# Patient Record
Sex: FEMALE | Race: WHITE
[De-identification: names, ages, dates, MRNs, and addresses within clinical notes are randomized per-mention and may not be internally consistent; named-entity substitution may affect disease eponyms.]

---

## 2017-07-21 ENCOUNTER — HOSPITAL ENCOUNTER (OUTPATIENT)
Dept: HOSPITAL 47 - LABPAT | Age: 72
Discharge: HOME | End: 2017-07-21
Payer: MEDICARE

## 2017-07-21 DIAGNOSIS — Z79.01: ICD-10-CM

## 2017-07-21 DIAGNOSIS — Z01.812: ICD-10-CM

## 2017-07-21 DIAGNOSIS — Z01.810: Primary | ICD-10-CM

## 2017-07-21 LAB
ALP SERPL-CCNC: 106 U/L (ref 38–126)
ALT SERPL-CCNC: 29 U/L (ref 9–52)
ANION GAP SERPL CALC-SCNC: 9 MMOL/L
APTT BLD: 25 SEC (ref 22–30)
AST SERPL-CCNC: 19 U/L (ref 14–36)
BUN SERPL-SCNC: 14 MG/DL (ref 7–17)
CALCIUM SPEC-MCNC: 9.4 MG/DL (ref 8.4–10.2)
CH: 32.9
CHCM: 35.4
CHLORIDE SERPL-SCNC: 103 MMOL/L (ref 98–107)
CO2 SERPL-SCNC: 27 MMOL/L (ref 22–30)
EKG: (no result)
ERYTHROCYTE [DISTWIDTH] IN BLOOD BY AUTOMATED COUNT: 4.57 M/UL (ref 3.8–5.4)
ERYTHROCYTE [DISTWIDTH] IN BLOOD: 16 % (ref 11.5–15.5)
GLUCOSE SERPL-MCNC: 69 MG/DL (ref 74–99)
HCT VFR BLD AUTO: 42.7 % (ref 34–46)
HDW: 3.14
HGB BLD-MCNC: 15 GM/DL (ref 11.4–16)
INR PPP: 1.1 (ref ?–1.2)
MCH RBC QN AUTO: 32.9 PG (ref 25–35)
MCHC RBC AUTO-ENTMCNC: 35.2 G/DL (ref 31–37)
MCV RBC AUTO: 93.5 FL (ref 80–100)
NON-AFRICAN AMERICAN GFR(MDRD): >60
PH UR: 6 [PH] (ref 5–8)
POTASSIUM SERPL-SCNC: 4.4 MMOL/L (ref 3.5–5.1)
PROT SERPL-MCNC: 6.4 G/DL (ref 6.3–8.2)
PT BLD: 10.8 SEC (ref 9–12)
SODIUM SERPL-SCNC: 139 MMOL/L (ref 137–145)
SP GR UR: 1.02 (ref 1–1.03)
UA BILLING (MACRO VS. MICRO): (no result)
UROBILINOGEN UR QL STRIP: <2 MG/DL (ref ?–2)
WBC # BLD AUTO: 7.2 K/UL (ref 3.8–10.6)

## 2017-07-21 PROCEDURE — 85027 COMPLETE CBC AUTOMATED: CPT

## 2017-07-21 PROCEDURE — 85610 PROTHROMBIN TIME: CPT

## 2017-07-21 PROCEDURE — 85730 THROMBOPLASTIN TIME PARTIAL: CPT

## 2017-07-21 PROCEDURE — 80053 COMPREHEN METABOLIC PANEL: CPT

## 2017-07-21 PROCEDURE — 87070 CULTURE OTHR SPECIMN AEROBIC: CPT

## 2017-07-21 PROCEDURE — 93005 ELECTROCARDIOGRAM TRACING: CPT

## 2017-07-21 PROCEDURE — 81003 URINALYSIS AUTO W/O SCOPE: CPT

## 2017-07-21 PROCEDURE — 36415 COLL VENOUS BLD VENIPUNCTURE: CPT

## 2017-07-31 ENCOUNTER — HOSPITAL ENCOUNTER (INPATIENT)
Dept: HOSPITAL 47 - 2ORMAIN | Age: 72
LOS: 1 days | Discharge: HOME HEALTH | DRG: 470 | End: 2017-08-01
Payer: COMMERCIAL

## 2017-07-31 VITALS — RESPIRATION RATE: 16 BRPM

## 2017-07-31 VITALS — BODY MASS INDEX: 26.4 KG/M2

## 2017-07-31 DIAGNOSIS — I25.2: ICD-10-CM

## 2017-07-31 DIAGNOSIS — K21.9: ICD-10-CM

## 2017-07-31 DIAGNOSIS — I25.10: ICD-10-CM

## 2017-07-31 DIAGNOSIS — Z88.2: ICD-10-CM

## 2017-07-31 DIAGNOSIS — I10: ICD-10-CM

## 2017-07-31 DIAGNOSIS — Z79.899: ICD-10-CM

## 2017-07-31 DIAGNOSIS — D62: ICD-10-CM

## 2017-07-31 DIAGNOSIS — M06.9: ICD-10-CM

## 2017-07-31 DIAGNOSIS — K57.30: ICD-10-CM

## 2017-07-31 DIAGNOSIS — M16.11: Primary | ICD-10-CM

## 2017-07-31 DIAGNOSIS — Z87.891: ICD-10-CM

## 2017-07-31 DIAGNOSIS — Z79.82: ICD-10-CM

## 2017-07-31 DIAGNOSIS — Z95.5: ICD-10-CM

## 2017-07-31 DIAGNOSIS — Z85.72: ICD-10-CM

## 2017-07-31 DIAGNOSIS — Z95.1: ICD-10-CM

## 2017-07-31 DIAGNOSIS — Z88.5: ICD-10-CM

## 2017-07-31 PROCEDURE — 0SR904A REPLACEMENT OF RIGHT HIP JOINT WITH CERAMIC ON POLYETHYLENE SYNTHETIC SUBSTITUTE, UNCEMENTED, OPEN APPROACH: ICD-10-PCS

## 2017-07-31 PROCEDURE — 88300 SURGICAL PATH GROSS: CPT

## 2017-07-31 PROCEDURE — 85025 COMPLETE CBC W/AUTO DIFF WBC: CPT

## 2017-07-31 PROCEDURE — 86901 BLOOD TYPING SEROLOGIC RH(D): CPT

## 2017-07-31 PROCEDURE — 86850 RBC ANTIBODY SCREEN: CPT

## 2017-07-31 PROCEDURE — 73501 X-RAY EXAM HIP UNI 1 VIEW: CPT

## 2017-07-31 PROCEDURE — 86900 BLOOD TYPING SEROLOGIC ABO: CPT

## 2017-07-31 RX ADMIN — CARVEDILOL SCH MG: 12.5 TABLET, FILM COATED ORAL at 20:45

## 2017-07-31 RX ADMIN — HYDROMORPHONE HYDROCHLORIDE PRN MG: 1 INJECTION, SOLUTION INTRAMUSCULAR; INTRAVENOUS; SUBCUTANEOUS at 10:02

## 2017-07-31 RX ADMIN — ASPIRIN 325 MG ORAL TABLET SCH MG: 325 PILL ORAL at 20:44

## 2017-07-31 RX ADMIN — HYDROMORPHONE HYDROCHLORIDE PRN MG: 1 INJECTION, SOLUTION INTRAMUSCULAR; INTRAVENOUS; SUBCUTANEOUS at 09:54

## 2017-07-31 RX ADMIN — CEFAZOLIN SCH: 330 INJECTION, POWDER, FOR SOLUTION INTRAMUSCULAR; INTRAVENOUS at 13:55

## 2017-07-31 RX ADMIN — HYDROMORPHONE HYDROCHLORIDE PRN MG: 1 INJECTION, SOLUTION INTRAMUSCULAR; INTRAVENOUS; SUBCUTANEOUS at 10:11

## 2017-07-31 RX ADMIN — POTASSIUM CHLORIDE SCH MLS: 14.9 INJECTION, SOLUTION INTRAVENOUS at 06:52

## 2017-07-31 NOTE — XR
Right hip limited

 

HISTORY: Hip replacement

 

2 intraoperative C-arm images document the procedure.

## 2017-07-31 NOTE — XR
EXAMINATION TYPE: XR Hip Limited RT

 

DATE OF EXAM: 7/31/2017

 

CLINICAL HISTORY: Right hip pain and osteoarthritis status post total replacement.

 

TECHNIQUE: Single AP portable view of right hip is obtained immediately postoperatively.  

 

COMPARISON: None.

 

FINDINGS: Metallic hardware from total right hip arthroplasty is seen and appears satisfactory in ali
gnment and position.  There is evidence of recent surgery with subcutaneous gas noted laterally.  

 

IMPRESSION: Metallic hardware from total right hip arthroplasty is satisfactory in position.

## 2017-07-31 NOTE — P.CONS
History of Present Illness





- Reason for Consult


Consult date: 17


Medical management


Requesting physician: Destin Edwards





- Chief Complaint


Hip surgery





- History of Present Illness


This is a pleasant 71-year-old patient of Dr. Schaeffer.  Patient's undergone 

right total hip arthroplasty.  Postprocedure sitting up on a chair comfortable.

  No chest pain no shortness no nausea vomiting.  Just finished her lunch.


Patient chronic stable medical conditions include coronary artery disease, GERD

, hypertension, osteoarthritis, diverticulosis.


Patient's  is the bedside.  Patient had a coronary bypass last year.  No 

Rx symptoms otherwise.





Past medical history:


Coronary artery disease with history of bypass, GERD, hypertension, 

osteoarthritis, rheumatoid arthritis, non-Hodgkin's lymphoma treated previously

, diverticulosis.








Review of Systems


GEN.: [None]


EYES: [None]


HEENT: [None]


NECK: [None]


RESPIRATORY: [None]


CARDIOVASCULAR: [None]


GASTROINTESTINAL: [None]


GENITOURINARY: [None]


MUSCULOSKELETAL: [Pain in other joints including the right knee]


LYMPHATICS: [None]


HEMATOLOGICAL: [None]  


PSYCHIATRY: [None]


NEUROLOGICAL: [None]





Past Medical History


Past Medical History: Coronary Artery Disease (CAD), Cancer, Chest Pain / Angina

, GERD/Reflux, Hypertension, Myocardial Infarction (MI), Rheumatoid Arthritis (

RA)


Additional Past Medical History / Comment(s): MI X2 (,), IRREGULAR 

HEART BEAT.,  2016- [place] NON LYMPHOMA (RECEIVED RITUXIN ), 

DIVERTICULOSIS., RECEIVING "CHICKEN FAT"  INJECTIONS., USES CANE AND WALKER.


Last Myocardial Infarction Date:: 2012


History of Any Multi-Drug Resistant Organisms: None Reported


Past Surgical History: Coronary Bypass/CABG, Heart Catheterization, Heart 

Catheterization With Stent, Orthopedic Surgery


Additional Past Surgical History / Comment(s): RAI CARPAL TUNNEL, RAI 

METATARSAL FOOT SURG, RIGHT BUNIONECTOMY, FX LEFT FOOT SURG., MULTIPLE HEART 

CATHS -STENTS X2 ( AND ), 8--15 total lt knee .,  CABG - 2016


Past Anesthesia/Blood Transfusion Reactions: No Reported Reaction


Date of Last Stent Placement:: 2012


Past Psychological History: Anxiety


Additional Psychological History / Comment(s): .


Smoking Status: Former smoker


Past Alcohol Use History: Daily


Additional Past Alcohol Use History / Comment(s): DRINKS 1 GLASS WINE DAILY.  

STARTED SMOKING AT 17 YRS OLD AND QUIT .SMOKED 2PPD, (SMOKED 31 YEARS)


Past Drug Use History: None Reported


Additional History: Drinks a glass of wine at night, smoked for 31 years 2 

packs a day.  In .  





- Past Family History


  ** Mother


Family Medical History: Rheumatoid Arthritis (RA)


Additional Family Medical History / Comment(s): .             -"old age

"- RA and "4 leaky heart valves"





  ** Father


Additional Family Medical History / Comment(s): dad  from brain aneurysm 

when pt was aGE 1





Medications and Allergies


 Home Medications











 Medication  Instructions  Recorded  Confirmed  Type


 


Aspirin EC [Ecotrin Low Dose] 81 mg PO DAILY 08/03/15 07/31/17 History


 


Carvedilol [Coreg] 25 mg PO BID 08/03/15 07/31/17 History


 


Nitroglycerin Sl Tabs [Nitrostat] 0.4 mg SUBLINGUAL Q5M PRN 08/03/15 07/31/17 

History


 


Ranitidine HCl [Zantac] 150 mg PO BID PRN 08/03/15 07/31/17 History


 


traMADol HCl [Ultram] 100 mg PO Q6HR PRN 08/03/15 07/31/17 History


 


ALPRAZolam [Xanax] 0.5 mg PO BID PRN 17 History


 


Fish Oil/Dha/Epa [Fish Oil 1,200 1 cap PO BID 17 History





mg Fish Oil]    


 


Polyethylene Glycol 3350 [Miralax] 17 gm PO DAILY 17 History


 


Pravastatin Sodium [Pravachol] 40 mg PO HS 17 History











 Allergies











Allergy/AdvReac Type Severity Reaction Status Date / Time


 


codeine Allergy Unknown Passed Out Verified 17 06:23


 


niacin Allergy Unknown Tingling Verified 17 06:23





[From [place]   of lips  





Extended-Release]   and face.  


 


Sulfa (Sulfonamide Allergy Unknown Swelling Verified 17 06:23





Antibiotics)   of Throat  


 


acetaminophen [From [place]] Allergy  Hallucinati Verified 17 06:23





   ons  


 


hydrocodone [From [place]] Allergy  Hallucinati Verified 17 06:23





   ons  


 


leflunomide [From Arava] Allergy  Hallucinati Verified 17 06:23





   ons  


 


prednisone Allergy  Hallucinati Verified 17 06:23





   ons  














Physical Exam


Vitals: 


 Vital Signs











  Temp Pulse Pulse Resp BP BP Pulse Ox


 


 17 14:28  97.6 F   70  18   116/76  98


 


 17 12:45    65    111/72 


 


 17 12:30    62    134/72 


 


 17 12:15    68    131/73 


 


 17 12:00    66    111/72 


 


 17 11:45    66    134/73 


 


 17 11:30    64    131/73 


 


 17 11:15    67    140/70 


 


 17 11:00    68    157/81 


 


 17 10:45  97.1 F L   80  17   150/77  97


 


 17 10:17   80   18   137/63  100


 


 17 10:01   67   16   153/78  100


 


 17 09:47   80   18   157/74  100


 


 17 09:32  97.2 F L  69   16   151/72  94 L


 


 17 06:30  97.4 F L   59 L  16  176/77   95








 Intake and Output








 Patient Weight











 17





 06:59


 


Weight 67.585 kg











VITAL SIGNS: [Reviewed.  BMI noted]


GENERAL: [Average built, sitting up on chair, comfortable].


EYES: [Pupils equal.  Conjunctiva arelen]l.


HEENT: [External appearance of nose and ears normal, oral cavity grossly normal]

.


NECK: [JVD not raised; masses not palpable].


HEART: [First and second heart sounds are normal;  no edema].  


LUNGS:[ Respiratory rate normal; clear to auscultation].  


ABDOMEN: [Soft,  nontender, liver spleen not palpable, no masses palpable].  


LYMPHATICS: [No lymph nodes palpable in the axilla and neck].  


PSYCH: [Alert and oriented x3;  mood  and affect arleen]l.  


NEUROLOGICAL: [Cranial nerves grossly intact; no facial asymmetry,   power and 

sensation grossly intact]


MUSCULOSKELETAL: Dressing over the right hip site, evidence of osteoarthritis 

especially in the hands and right knee.  








Results


Results: 


No current labs


Labs from 17 showed a hemoglobin of 15 with impression of 4.4 BUN/

creatinine to be normal





Assessment and Plan


Plan: 


Assessment:


-Right total hip arthroplasty


-Coronary artery disease with a prior history of CABG


-GERD


-Essential hypertension


-Bilateral chronic rheumatoid arthritis


-Chronic colonic diverticulosis


-Primary osteoarthritis of multiple joints including the right knee





Plan:


Home medications are to be resumed.  Care was discussed with the patient has 

been at the bedside.  Patient is on aspirin 325 mg twice a day per Dr. Edwards.  Will follow the patient closely.





Thank you Dr. Edwards follow the patient with you.

## 2017-07-31 NOTE — P.OP
Date of Procedure: 07/31/17


Preoperative Diagnosis: 


Severe osteoarthritis of the right hip


Postoperative Diagnosis: 


Severe osteoarthritis of the right hip


Procedure(s) Performed: 


Right total hip arthroplasty with a direct anterior approach


Implants: 


Smith and nephew Polarstem size 5 standard


Smith & Nephew R3, 3 hole acetabular shell, 48 mm


Smith & Nephew reflection 6.5 mm cancellus screw, 20 mm 2


Smith & Nephew R3, XLPE 20 acetabular liner


Smith & Nephew Oxinium femoral head 32 m, +0


All components were press-fit.


The articulation is ceramic on polyethylene.


Anesthesia: GETA


Surgeon: Destin Edwards


Assistant #1: Gwendolyn Thomas


Estimated Blood Loss (ml): 200 (60 mL returned with Cell Saver)


Pathology: other (Femoral head)


Condition: stable


Disposition: PACU


Indications for Procedure: 


After failure of conservative treatment we discussed the surgical and 

nonsurgical treatment options at length.  Patient wishes to proceed with a 

total hip arthroplasty with a direct anterior approach.  Complications specific 

to this procedure were discussed at length, including but not limited to 

infection, leg length discrepancy, dislocation, and nerve injury.  Patient is 

aware of all these complications and informed consent was obtained


Operative Findings: 


The operative findings are consistent with severe osteoarthritis of the right 

hip


Description of Procedure: 


Patient was seen and evaluated in the preoperative area, consent was reviewed, 

and the surgical site was marked with a skin marker.  Patient was then brought 

to the operating room and given prophylactic antibiotics intravenously.  1 g of 

Tranexamic acid was also given.  A general anesthetic was administered by the 

anesthesia department.   The patient was then placed on the [place] table with the 

bony prominences well-padded.  The hip area was then prepped and draped in 

usual sterile fashion.





A universal timeout was then performed, which confirmed the patient's name, 

surgical site, ALLERGIES, and procedure being performed.  Next the incision 

site was located at 1 cm distal and 1 cm lateral to the anterior superior iliac 

spine.  The skin and subcutaneous tissues were sharply incised.  Incision was 

carefully dissected down to the fascia overlying the tensor fascia obdulia muscle.

  This fascia was then incised in line with the incision.  Next, using blunt 

finger dissection, the tensor fascia obdulia muscle was dissected off its 

investing fascia.  The muscle was then carefully retracted laterally with a 

cobra retractor over the lateral neck of the femur.  Next, the circumflex 

vessels were identified and cauterized using the AquaMantis device.  The 

anterior hip capsule was then exposed.  The capsule was then opened and an 

inverted T fashion.  Cobra retractors were then placed intracapsularly.  The 

proximal femur was then visualized.





The femoral neck was then osteotomized appropriate level above the lesser 

trochanter.  Small amount of traction was placed with the [place] table.  A small 

wedge of bone was then removed from the remaining femoral head.  Next, using a 

corkscrew femoral head was easily removed from the acetabulum.  On gross visual 

inspection, the femoral head had complete loss of articular cartilage in 

multiple periarticular osteophytes.  Attention was then turned to the 

acetabulum.





the acetabulum was exposed and any remaining labrum was excised.  Sequential 

reaming of the acetabulum was performed using fluoroscopic guidance.  When the 

appropriate size was reached, a trial was then placed.  The position and fit of 

the trial was checked with fluoroscopy.  The trial was then removed.  Then, 

using fluoroscopic guidance, the final implant was impacted at 20 of 

anteversion and 40 of abduction, and fully seated in the acetabulum.  2 screws 

were then placed in the acetabulum.  Again fluoroscopy was used to check 

position of the screws.  Next, the liner was then impacted, with a 20 elevated 

liner located in the anterior superior quadrant.  Component locking was 

confirmed.





Attention was then directed to the femur.  With the aid of the [place] table, the 

femur was externally rotated to approximately 130, extended, and abducted 

under the opposite leg.  A side hook was then placed under the proximal femur, 

and the side hook elevator was used to elevate the proximal femur.  Retractors 

were then placed.  A capsular release was performed, as well as a release of 

the conjoined tendon, which afforded excellent visualization of the proximal 

femur.  Next, a box osteotome was used to lateralize the proximal femur.  A 

 was then used to locate the femoral canal.  Sequential broaching 

was then performed with appropriate size which afforded excellent fixation in 

the proximal femur.  A trial was then placed with appropriate head and neck, 

and the hip was gently reduced with the aid of the [place] table.  Fluoroscopy was 

then used to check position of the components, as well as to ensure equal leg 

lengths.  The hip was then gently dislocated and the trials were then removed.  

Final implants were then impacted and the hip was again reduced.  Final 

fluoroscopic x-rays confirmed that the components were in anatomic position, as 

well as equal leg lengths.  The hip was also taken through range of motion, and 

found to be stable.





The hip was then copiously irrigated with antibiotic solution with pulsatile 

lavage.  The hip was then irrigated with Irrisept solution.  The soft tissues 

were then injected with a ropivacaine solution, which consisted of 246.25 mg of 

ropivacaine, 0.5 mg of epinephrine, 30 mg of Toradol, 80 g of clonidine, and 

48.45 mL of sterile water, for a total of 100 mL of fluid injected.  A second 

dose of 1 g of Tranexamic acid was also given.





the fascia was then closed with 2-0 strata fix suture.  The subcutaneous tissue 

was closed with 3-0 Vicryl.  The subcuticular tissue was closed with 3-0 strata 

fix suture.  The skin was then closed with Dermabond tape.  The patient was 

then transferred to the recovery room in stable condition.





The assistant  SARAI Cox was required due to the complexity of surgery, 

and the need for skilled surgical assistant for positioning, draping, exposure, 

retraction, and closure of the wound.

## 2017-08-01 VITALS — HEART RATE: 73 BPM | TEMPERATURE: 97.2 F | DIASTOLIC BLOOD PRESSURE: 66 MMHG | SYSTOLIC BLOOD PRESSURE: 142 MMHG

## 2017-08-01 LAB
BASOPHILS # BLD AUTO: 0 K/UL (ref 0–0.2)
BASOPHILS NFR BLD AUTO: 0 %
CH: 32.3
CHCM: 34.6
EOSINOPHIL # BLD AUTO: 0 K/UL (ref 0–0.7)
EOSINOPHIL NFR BLD AUTO: 0 %
ERYTHROCYTE [DISTWIDTH] IN BLOOD BY AUTOMATED COUNT: 3.36 M/UL (ref 3.8–5.4)
ERYTHROCYTE [DISTWIDTH] IN BLOOD: 15.3 % (ref 11.5–15.5)
HCT VFR BLD AUTO: 31.6 % (ref 34–46)
HDW: 3.22
HGB BLD-MCNC: 10.7 GM/DL (ref 11.4–16)
LUC NFR BLD AUTO: 2 %
LYMPHOCYTES # SPEC AUTO: 1 K/UL (ref 1–4.8)
LYMPHOCYTES NFR SPEC AUTO: 12 %
MCH RBC QN AUTO: 31.9 PG (ref 25–35)
MCHC RBC AUTO-ENTMCNC: 33.9 G/DL (ref 31–37)
MCV RBC AUTO: 94.1 FL (ref 80–100)
MONOCYTES # BLD AUTO: 0.8 K/UL (ref 0–1)
MONOCYTES NFR BLD AUTO: 9 %
NEUTROPHILS # BLD AUTO: 6.9 K/UL (ref 1.3–7.7)
NEUTROPHILS NFR BLD AUTO: 77 %
WBC # BLD AUTO: 0.18 10*3/UL
WBC # BLD AUTO: 8.9 K/UL (ref 3.8–10.6)
WBC (PEROX): 9.45

## 2017-08-01 RX ADMIN — POTASSIUM CHLORIDE SCH: 14.9 INJECTION, SOLUTION INTRAVENOUS at 04:52

## 2017-08-01 RX ADMIN — CARVEDILOL SCH MG: 12.5 TABLET, FILM COATED ORAL at 08:19

## 2017-08-01 RX ADMIN — ASPIRIN 325 MG ORAL TABLET SCH MG: 325 PILL ORAL at 08:18

## 2017-08-01 RX ADMIN — CEFAZOLIN SCH: 330 INJECTION, POWDER, FOR SOLUTION INTRAMUSCULAR; INTRAVENOUS at 03:16

## 2017-08-01 NOTE — P.DS
Providers


Date of admission: 


07/31/17 05:50





Expected date of discharge: 08/01/17


Attending physician: 


Destin Edwards





Consults: 





 





07/31/17 09:30


Consult Physician Routine 


   Consulting Provider: Jose Alfredo Petit


   Consult Reason/Comments: medical management


   Do you want consulting provider notified?: Yes











Primary care physician: 


[employer] Course: 


This is a 71-year-old female with known history of degenerative arthritis of 

the right hip.  The patient presents for evaluation.  After discussion and 

consideration patient elects to proceed with total hip arthroplasty.  The 

patient is seen preoperatively by Dr. Edwards and cleared for surgery.





Patient is admitted to [facility] on 07/31/2017 for total hip 

arthroplasty.  The procedures performed without complication or sequelae.  The 

patient is doing well postoperatively.  Labs and vital signs are stable on day 

of discharge.





On day of discharge patient's hip incision is healing well.  There is minimal 

erythema.  There is no drainage noted at this time.  There is minimal soft 

tissue swelling to the hip and thigh.  Patient has full foot and ankle motion 

without difficulty or pain.  Neurovascular status to the right lower extremity 

is intact.  Patient is discharged home in good condition.Please see med rec for 

accurate list of home medications.








Plan - Discharge Summary


New Discharge Prescriptions: 


New


   Aspirin 325 mg PO BID #60 tab


   Sennosides-Docusate Sodium [Senokot-S] 1 tab PO BID #60 tablet


   traMADol HCl [Ultram] 1 - 2 tab PO Q6H PRN #90 tab


     PRN Reason: Pain





No Action


   traMADol HCl [Ultram] 100 mg PO Q6HR PRN


     PRN Reason: Pain


   Ranitidine HCl [Zantac] 150 mg PO BID PRN


     PRN Reason: Heartburn


   Nitroglycerin Sl Tabs [Nitrostat] 0.4 mg SUBLINGUAL Q5M PRN


     PRN Reason: Chest Pain


   Carvedilol [Coreg] 25 mg PO BID


   Aspirin EC [Ecotrin Low Dose] 81 mg PO DAILY


   Pravastatin Sodium [Pravachol] 40 mg PO HS


   Fish Oil/Dha/Epa [Fish Oil 1,200 mg Fish Oil] 1 cap PO BID


   Polyethylene Glycol 3350 [Miralax] 17 gm PO DAILY


   ALPRAZolam [Xanax] 0.5 mg PO BID PRN


     PRN Reason: Anxiety


Discharge Medication List





Aspirin EC [Ecotrin Low Dose] 81 mg PO DAILY 08/03/15 [History]


Carvedilol [Coreg] 25 mg PO BID 08/03/15 [History]


Nitroglycerin Sl Tabs [Nitrostat] 0.4 mg SUBLINGUAL Q5M PRN 08/03/15 [History]


Ranitidine HCl [Zantac] 150 mg PO BID PRN 08/03/15 [History]


traMADol HCl [Ultram] 100 mg PO Q6HR PRN 08/03/15 [History]


ALPRAZolam [Xanax] 0.5 mg PO BID PRN 07/24/17 [History]


Fish Oil/Dha/Epa [Fish Oil 1,200 mg Fish Oil] 1 cap PO BID 07/24/17 [History]


Polyethylene Glycol 3350 [Miralax] 17 gm PO DAILY 07/24/17 [History]


Pravastatin Sodium [Pravachol] 40 mg PO HS 07/24/17 [History]


Aspirin 325 mg PO BID #60 tab 08/01/17 [Rx]


Sennosides-Docusate Sodium [Senokot-S] 1 tab PO BID #60 tablet 08/01/17 [Rx]


traMADol HCl [Ultram] 1 - 2 tab PO Q6H PRN #90 tab 08/01/17 [Rx]








Follow up Appointment(s)/Referral(s): 


Destin Edwards DO [Doctor of Osteopathic Medicine] - 2 Weeks


Activity/Diet/Wound Care/Special Instructions: 


Weightbearing as tolerated with walker


May shower after 2 days if no drainage from the incision


Follow-up with [facility] in 2 weeks with any questions or concerns


Discharge Disposition: HOME WITH HOME HEALTH SERVICES

## 2018-06-08 ENCOUNTER — HOSPITAL ENCOUNTER (OUTPATIENT)
Dept: HOSPITAL 47 - LABPAT | Age: 73
Discharge: HOME | End: 2018-06-08
Payer: MEDICARE

## 2018-06-08 DIAGNOSIS — Z51.81: ICD-10-CM

## 2018-06-08 DIAGNOSIS — Z79.01: ICD-10-CM

## 2018-06-08 DIAGNOSIS — Z01.812: Primary | ICD-10-CM

## 2018-06-08 LAB
ALBUMIN SERPL-MCNC: 4.1 G/DL (ref 3.5–5)
ALP SERPL-CCNC: 78 U/L (ref 38–126)
ALT SERPL-CCNC: 32 U/L (ref 9–52)
ANION GAP SERPL CALC-SCNC: 12 MMOL/L
APTT BLD: 23.9 SEC (ref 22–30)
AST SERPL-CCNC: 22 U/L (ref 14–36)
BUN SERPL-SCNC: 16 MG/DL (ref 7–17)
CALCIUM SPEC-MCNC: 9.6 MG/DL (ref 8.4–10.2)
CHLORIDE SERPL-SCNC: 106 MMOL/L (ref 98–107)
CO2 SERPL-SCNC: 25 MMOL/L (ref 22–30)
ERYTHROCYTE [DISTWIDTH] IN BLOOD BY AUTOMATED COUNT: 4.94 M/UL (ref 3.8–5.4)
ERYTHROCYTE [DISTWIDTH] IN BLOOD: 13.3 % (ref 11.5–15.5)
GLUCOSE SERPL-MCNC: 73 MG/DL (ref 74–99)
HCT VFR BLD AUTO: 45.6 % (ref 34–46)
HGB BLD-MCNC: 16.3 GM/DL (ref 11.4–16)
INR PPP: 1.1 (ref ?–1.2)
MCH RBC QN AUTO: 32.9 PG (ref 25–35)
MCHC RBC AUTO-ENTMCNC: 35.7 G/DL (ref 31–37)
MCV RBC AUTO: 92.4 FL (ref 80–100)
PH UR: 6.5 [PH] (ref 5–8)
PLATELET # BLD AUTO: 244 K/UL (ref 150–450)
POTASSIUM SERPL-SCNC: 4.4 MMOL/L (ref 3.5–5.1)
PROT SERPL-MCNC: 6.2 G/DL (ref 6.3–8.2)
PT BLD: 10.7 SEC (ref 9–12)
SODIUM SERPL-SCNC: 143 MMOL/L (ref 137–145)
SP GR UR: 1 (ref 1–1.03)
UROBILINOGEN UR QL STRIP: <2 MG/DL (ref ?–2)
WBC # BLD AUTO: 5.9 K/UL (ref 3.8–10.6)

## 2018-06-08 PROCEDURE — 85027 COMPLETE CBC AUTOMATED: CPT

## 2018-06-08 PROCEDURE — 85610 PROTHROMBIN TIME: CPT

## 2018-06-08 PROCEDURE — 36415 COLL VENOUS BLD VENIPUNCTURE: CPT

## 2018-06-08 PROCEDURE — 81003 URINALYSIS AUTO W/O SCOPE: CPT

## 2018-06-08 PROCEDURE — 85730 THROMBOPLASTIN TIME PARTIAL: CPT

## 2018-06-08 PROCEDURE — 87070 CULTURE OTHR SPECIMN AEROBIC: CPT

## 2018-06-08 PROCEDURE — 80053 COMPREHEN METABOLIC PANEL: CPT

## 2018-06-19 ENCOUNTER — HOSPITAL ENCOUNTER (INPATIENT)
Dept: HOSPITAL 47 - 2ORMAIN | Age: 73
LOS: 1 days | Discharge: HOME HEALTH | DRG: 470 | End: 2018-06-20
Payer: MEDICARE

## 2018-06-19 VITALS — RESPIRATION RATE: 16 BRPM

## 2018-06-19 VITALS — BODY MASS INDEX: 31.8 KG/M2

## 2018-06-19 DIAGNOSIS — Z85.71: ICD-10-CM

## 2018-06-19 DIAGNOSIS — E78.5: ICD-10-CM

## 2018-06-19 DIAGNOSIS — Z95.1: ICD-10-CM

## 2018-06-19 DIAGNOSIS — I10: ICD-10-CM

## 2018-06-19 DIAGNOSIS — Z96.652: ICD-10-CM

## 2018-06-19 DIAGNOSIS — Z79.899: ICD-10-CM

## 2018-06-19 DIAGNOSIS — Z88.2: ICD-10-CM

## 2018-06-19 DIAGNOSIS — I25.2: ICD-10-CM

## 2018-06-19 DIAGNOSIS — Z79.82: ICD-10-CM

## 2018-06-19 DIAGNOSIS — K21.9: ICD-10-CM

## 2018-06-19 DIAGNOSIS — Z88.5: ICD-10-CM

## 2018-06-19 DIAGNOSIS — Z88.8: ICD-10-CM

## 2018-06-19 DIAGNOSIS — M06.9: ICD-10-CM

## 2018-06-19 DIAGNOSIS — I25.10: ICD-10-CM

## 2018-06-19 DIAGNOSIS — M16.12: Primary | ICD-10-CM

## 2018-06-19 DIAGNOSIS — Z87.891: ICD-10-CM

## 2018-06-19 PROCEDURE — 85025 COMPLETE CBC W/AUTO DIFF WBC: CPT

## 2018-06-19 PROCEDURE — 86901 BLOOD TYPING SEROLOGIC RH(D): CPT

## 2018-06-19 PROCEDURE — 86850 RBC ANTIBODY SCREEN: CPT

## 2018-06-19 PROCEDURE — 86891 AUTOLOGOUS BLOOD OP SALVAGE: CPT

## 2018-06-19 PROCEDURE — 0SRB06A REPLACEMENT OF LEFT HIP JOINT WITH OXIDIZED ZIRCONIUM ON POLYETHYLENE SYNTHETIC SUBSTITUTE, UNCEMENTED, OPEN APPROACH: ICD-10-PCS

## 2018-06-19 PROCEDURE — 86900 BLOOD TYPING SEROLOGIC ABO: CPT

## 2018-06-19 PROCEDURE — 73501 X-RAY EXAM HIP UNI 1 VIEW: CPT

## 2018-06-19 PROCEDURE — 88300 SURGICAL PATH GROSS: CPT

## 2018-06-19 RX ADMIN — ASPIRIN 325 MG ORAL TABLET SCH MG: 325 PILL ORAL at 21:48

## 2018-06-19 RX ADMIN — CARVEDILOL SCH MG: 12.5 TABLET, FILM COATED ORAL at 16:54

## 2018-06-19 RX ADMIN — CEFAZOLIN SCH: 330 INJECTION, POWDER, FOR SOLUTION INTRAMUSCULAR; INTRAVENOUS at 12:53

## 2018-06-19 RX ADMIN — DEXTROSE ONE: 50 INJECTION, SOLUTION INTRAVENOUS at 12:14

## 2018-06-19 RX ADMIN — POTASSIUM CHLORIDE SCH: 14.9 INJECTION, SOLUTION INTRAVENOUS at 13:52

## 2018-06-19 RX ADMIN — DEXTROSE ONE MG: 50 INJECTION, SOLUTION INTRAVENOUS at 08:30

## 2018-06-19 RX ADMIN — POTASSIUM CHLORIDE SCH MLS: 14.9 INJECTION, SOLUTION INTRAVENOUS at 08:21

## 2018-06-19 RX ADMIN — CEFAZOLIN SCH GM: 10 INJECTION, POWDER, FOR SOLUTION INTRAVENOUS at 16:54

## 2018-06-19 RX ADMIN — MELOXICAM ONE: 7.5 TABLET ORAL at 12:15

## 2018-06-19 RX ADMIN — MELOXICAM ONE MG: 7.5 TABLET ORAL at 08:13

## 2018-06-19 NOTE — XR
EXAMINATION TYPE: XR Hip Limited LT

 

DATE OF EXAM: 6/19/2018

 

CLINICAL HISTORY: Left hip pain and osteoarthritis.

 

TECHNIQUE: Single AP portable view of left hip is obtained immediately postoperatively.  

 

COMPARISON: None.

 

FINDINGS: Metallic hardware from left hip arthroplasty is seen and appears satisfactory in alignment 
and position.  There is evidence of recent surgery with subcutaneous gas noted laterally.  

 

IMPRESSION: Metallic hardware from left hip arthroplasty is satisfactory in position.

## 2018-06-19 NOTE — FL
EXAMINATION TYPE: FL guidance operating room

 

DATE OF EXAM: 6/19/2018

 

HISTORY: Flouroscopy  time

 

Less than 60 seconds of fluoroscopy provided. 

 

IMPRESSION:

1. Fluoroscopy time.

## 2018-06-19 NOTE — P.CONS
History of Present Illness





- Reason for Consult


Recommendations regarding antihypertensive medications





- History of Present Illness


71-year-old pleasant female underwent left hip arthroplasty, does have history 

of coronary disease in the past.  With comparing of pain in the left hip area 

patient blood pressure is elevated because of the pain I believe.  Patient 

denied any fever chills nausea vomiting did not pass gas yet.  Patient doesn't 

have a [place] catheter of a surgical drain in place.











Review of Systems


REVIEW OF SYSTEMS: 


CONSTITUTIONAL: No fever, no malaise, no fatigue. 


HEENT: No recent visual problems or hearing problems. Denied any sore throat. 


CARDIOVASCULAR: No chest pain, orthopnea, PND, no palpitations, no syncope. 


PULMONARY: No shortness of breath, no cough, no hemoptysis. 


GASTROINTESTINAL: No diarrhea, no nausea, no vomiting, no abdominal pain. 

Normoactive bowel sounds. 


NEUROLOGICAL: No headaches, no weakness, no numbness. 


HEMATOLOGICAL: Denies any bleeding or petechiae. 


GENITOURINARY: Denies any burning micturition, frequency, or urgency. 


MUSCULOSKELETAL/RHEUMATOLOGICAL: As mentioned in HPI


ENDOCRINE: Denies any polyuria or polydipsia. 





The rest of the 14-point review of systems is negative.











Past Medical History


Past Medical History: Coronary Artery Disease (CAD), Cancer, GERD/Reflux, 

Hyperlipidemia, Hypertension, Myocardial Infarction (MI), Rheumatoid Arthritis (

RA)


Additional Past Medical History / Comment(s): MI X2 (,), IRREGULAR 

HEART BEAT.,  2016- [place] NON LYMPHOMA (RECEIVED RITUXIN ), 

DIVERTICULOSIS., USES CANE AND WALKER.


Last Myocardial Infarction Date:: 2012


History of Any Multi-Drug Resistant Organisms: None Reported


Past Surgical History: Coronary Bypass/CABG, Heart Catheterization, Heart 

Catheterization With Stent, Joint Replacement, Orthopedic Surgery


Additional Past Surgical History / Comment(s): RAI CARPAL TUNNEL, RAI 

METATARSAL FOOT SURG, RIGHT BUNIONECTOMY, FX LEFT FOOT SURG., MULTIPLE HEART 

CATHS -STENTS X2 ( AND ), 8--15 total lt knee ., triple bypass 2016


Past Anesthesia/Blood Transfusion Reactions: No Reported Reaction


Date of Last Stent Placement:: 2012


Past Psychological History: Anxiety


Additional Psychological History / Comment(s): .


Smoking Status: Former smoker


Past Alcohol Use History: Daily


Additional Past Alcohol Use History / Comment(s): DRINKS 1 GLASS WINE DAILY.  

STARTED SMOKING AT 17 YRS OLD AND QUIT .SMOKED 2PPD, (SMOKED 31 YEARS)


Past Drug Use History: None Reported





- Past Family History


  ** Mother


Family Medical History: Rheumatoid Arthritis (RA)


Additional Family Medical History / Comment(s): .             -"old age

"- RA and "4 leaky heart valves"





  ** Father


Additional Family Medical History / Comment(s): dad  from brain aneurysm 

when pt was aGE 1





Medications and Allergies


 Home Medications











 Medication  Instructions  Recorded  Confirmed  Type


 


Aspirin EC [Ecotrin Low Dose] 81 mg PO DAILY 08/03/15 06/19/18 History


 


Carvedilol [Coreg] 25 mg PO BID 08/03/15 06/19/18 History


 


Nitroglycerin Sl Tabs [Nitrostat] 0.4 mg SUBLINGUAL Q5M PRN 08/03/15 06/19/18 

History


 


ALPRAZolam [Xanax] 0.5 mg PO BID PRN 17 History


 


Fish Oil/Dha/Epa [Fish Oil 1,200 1 cap PO BID 17 History





mg Fish Oil]    


 


Polyethylene Glycol 3350 [Miralax] 17 gm PO Q48H 17 History


 


Pravastatin Sodium [Pravachol] 40 mg PO HS 17 History


 


Omeprazole [PriLOSEC] 20 mg PO DAILY 18 History


 


traMADol HCl [Ultram] 50 - 100 mg PO Q6H PRN 18 History











 Allergies











Allergy/AdvReac Type Severity Reaction Status Date / Time


 


codeine Allergy Unknown Passed Out Verified 18 10:12


 


niacin Allergy Unknown Tingling Verified 18 10:12





[From Niaspan   of lips  





Extended-Release]   and face.  


 


Sulfa (Sulfonamide Allergy Unknown Swelling Verified 18 10:12





Antibiotics)   of Throat  


 


hydrocodone [From [place]] Allergy  Hallucinati Verified 18 10:12





   ons  


 


leflunomide [From Arava] Allergy  Hallucinati Verified 18 10:12





   ons  


 


prednisone Allergy  Hallucinati Verified 18 10:12





   ons  


 


cloth tape AdvReac  red Uncoded 18 07:56





   irritated  





   skin  














Physical Exam


Vitals: 


 Vital Signs











  Temp Pulse Pulse Resp BP BP Pulse Ox


 


 18 12:06   61   16   161/77  96


 


 18 11:51   55 L   14   167/77  100


 


 18 11:36   63   16   168/77  100


 


 18 11:21  97.1 F L  67   16   169/74  94 L


 


 18 07:59  97.6 F   65  18  177/84   94 L








 Intake and Output











 18





 22:59 06:59 14:59


 


Intake Total   1201


 


Output Total   400


 


Balance   801


 


Intake:   


 


  IV   1201


 


Output:   


 


  Estimated Blood Loss   400


 


Other:   


 


  Weight   81.647 kg











PHYSICAL EXAMINATION: 





GENERAL: The patient is alert and oriented x3, not in any acute distress. Well 

developed, well nourished. 


HEENT: Pupils are round and equally reacting to light. EOMI. No scleral 

icterus. No conjunctival pallor. Normocephalic, atraumatic. No pharyngeal 

erythema. No thyromegaly. 


CARDIOVASCULAR: S1 and S2 present. No murmurs, rubs, or gallops. 


PULMONARY: Chest is clear to auscultation, no wheezing or crackles. 


ABDOMEN: Soft, nontender, nondistended, normoactive bowel sounds. No palpable 

organomegaly. 


MUSCULOSKELETAL: Deferred to orthopedic surgery


EXTREMITIES: No cyanosis, clubbing, or pedal edema. 


NEUROLOGICAL: Gross neurological examination did not reveal any focal deficits. 


SKIN: No rashes. 











Assessment and Plan


Plan: 


-Coronary artery disease: Patient will be resumed on beta blockers and 

antiplatelet therapy.


-Hypertension patient blood pressure is bit elevated because of her pain we'll 

treat the pain.  Resume her antidepressant medications


-Gastroesophageal reflux disease


-Hyperlipidemia


-Rheumatoid arthritis


-Left hip arthroplasty pain management and DVT prophylaxis as per primary 

service.


Thank you for letting report is pending this patient's care will can you to 

follow on as-needed basis.

## 2018-06-19 NOTE — XR
EXAMINATION TYPE: XR Hip Limited LT

 

DATE OF EXAM: 6/19/2018

 

COMPARISON: NONE

 

HISTORY: Postop

 

TECHNIQUE: One view submitted.

 

FINDINGS:

There is postsurgical change in near anatomic alignment.  There is soft tissue edema and emphysema. 

 

IMPRESSION:

1. Postoperative change.  Appears in near-anatomic alignment.

## 2018-06-20 VITALS — DIASTOLIC BLOOD PRESSURE: 81 MMHG | HEART RATE: 73 BPM | SYSTOLIC BLOOD PRESSURE: 145 MMHG | TEMPERATURE: 97.4 F

## 2018-06-20 LAB
BASOPHILS # BLD AUTO: 0 K/UL (ref 0–0.2)
BASOPHILS NFR BLD AUTO: 0 %
EOSINOPHIL # BLD AUTO: 0 K/UL (ref 0–0.7)
EOSINOPHIL NFR BLD AUTO: 0 %
ERYTHROCYTE [DISTWIDTH] IN BLOOD BY AUTOMATED COUNT: 4.33 M/UL (ref 3.8–5.4)
ERYTHROCYTE [DISTWIDTH] IN BLOOD: 13.1 % (ref 11.5–15.5)
HCT VFR BLD AUTO: 39.8 % (ref 34–46)
HGB BLD-MCNC: 13.8 GM/DL (ref 11.4–16)
LYMPHOCYTES # SPEC AUTO: 1.3 K/UL (ref 1–4.8)
LYMPHOCYTES NFR SPEC AUTO: 8 %
MCH RBC QN AUTO: 31.9 PG (ref 25–35)
MCHC RBC AUTO-ENTMCNC: 34.7 G/DL (ref 31–37)
MCV RBC AUTO: 91.8 FL (ref 80–100)
MONOCYTES # BLD AUTO: 1.3 K/UL (ref 0–1)
MONOCYTES NFR BLD AUTO: 8 %
NEUTROPHILS # BLD AUTO: 12.7 K/UL (ref 1.3–7.7)
NEUTROPHILS NFR BLD AUTO: 82 %
PLATELET # BLD AUTO: 231 K/UL (ref 150–450)
WBC # BLD AUTO: 15.5 K/UL (ref 3.8–10.6)

## 2018-06-20 RX ADMIN — CEFAZOLIN SCH GM: 10 INJECTION, POWDER, FOR SOLUTION INTRAVENOUS at 02:08

## 2018-06-20 RX ADMIN — CARVEDILOL SCH MG: 12.5 TABLET, FILM COATED ORAL at 07:41

## 2018-06-20 RX ADMIN — CEFAZOLIN SCH: 330 INJECTION, POWDER, FOR SOLUTION INTRAMUSCULAR; INTRAVENOUS at 04:36

## 2018-06-20 RX ADMIN — ASPIRIN 325 MG ORAL TABLET SCH MG: 325 PILL ORAL at 07:41

## 2018-06-20 NOTE — P.DS
Providers


Date of admission: 


06/19/18 07:30





Expected date of discharge: 06/20/18


Attending physician: 


Destin Edwards





Consults: 





 





06/19/18 09:26


Consult Physician Routine 


   Consulting Provider: Rosas Clark


   Consult Reason/Comments: medical management


   Do you want consulting provider notified?: Yes





06/19/18 12:39


Consult Physician Routine 


   Consulting Provider: Mickey Pulliam


   Consult Reason/Comments: medical management


   Do you want consulting provider notified?: Yes











Primary care physician: 


Rosas Clark








- Discharge Diagnosis(es)


(1) Primary osteoarthritis of left hip


Current Visit: Yes   Status: Acute   





(2) S/P total hip arthroplasty


Current Visit: Yes   Status: Acute   


Hospital Course: 


This is a 72-year-old female with known history of degenerative arthritis of 

the left hip.  The patient presents for evaluation.  After discussion and 

consideration patient elects to proceed with total hip arthroplasty.  The 

patient is seen preoperatively by Dr. Edwards and medically cleared for 

surgery by their primary care physician.





Patient is admitted to [facility] on 06/19/2018 for total hip 

arthroplasty.  The procedures performed without complication or sequelae.  The 

patient is doing well postoperatively.  Labs and vital signs are stable on day 

of discharge.





On day of discharge patient's hip incision is healing well.  There is minimal 

erythema.  There is no drainage noted at this time.  There is minimal soft 

tissue swelling to the hip and thigh.  Patient has full foot and ankle motion 

without difficulty or pain.  Neurovascular status to the left lower extremity 

is intact.  Patient is discharged home in good condition. Please see med rec 

for accurate list of home medications.








Plan - Discharge Summary


Discharge Rx Participant: No


New Discharge Prescriptions: 


New


   Aspirin 325 mg PO BID #60 tab


   Sennosides [Senokot] 1 tab PO BID #60 tablet


   traMADol HCl [Ultram] 1 - 2 tab PO Q6H PRN #90 tab


     PRN Reason: Pain





No Action


   Nitroglycerin Sl Tabs [Nitrostat] 0.4 mg SUBLINGUAL Q5M PRN


     PRN Reason: Chest Pain


   Carvedilol [Coreg] 25 mg PO BID


   Aspirin EC [Ecotrin Low Dose] 81 mg PO DAILY


   Pravastatin Sodium [Pravachol] 40 mg PO HS


   Fish Oil/Dha/Epa [Fish Oil 1,200 mg Fish Oil] 1 cap PO BID


   Polyethylene Glycol 3350 [Miralax] 17 gm PO Q48H


   ALPRAZolam [Xanax] 0.5 mg PO BID PRN


     PRN Reason: Anxiety


   Omeprazole [PriLOSEC] 20 mg PO DAILY


   traMADol HCl [Ultram] 50 - 100 mg PO Q6H PRN


     PRN Reason: Pain


Discharge Medication List





Aspirin EC [Ecotrin Low Dose] 81 mg PO DAILY 08/03/15 [History]


Carvedilol [Coreg] 25 mg PO BID 08/03/15 [History]


Nitroglycerin Sl Tabs [Nitrostat] 0.4 mg SUBLINGUAL Q5M PRN 08/03/15 [History]


ALPRAZolam [Xanax] 0.5 mg PO BID PRN 07/24/17 [History]


Fish Oil/Dha/Epa [Fish Oil 1,200 mg Fish Oil] 1 cap PO BID 07/24/17 [History]


Polyethylene Glycol 3350 [Miralax] 17 gm PO Q48H 07/24/17 [History]


Pravastatin Sodium [Pravachol] 40 mg PO HS 07/24/17 [History]


Omeprazole [PriLOSEC] 20 mg PO DAILY 06/11/18 [History]


traMADol HCl [Ultram] 50 - 100 mg PO Q6H PRN 06/11/18 [History]


Aspirin 325 mg PO BID #60 tab 06/20/18 [Rx]


Sennosides [Senokot] 1 tab PO BID #60 tablet 06/20/18 [Rx]


traMADol HCl [Ultram] 1 - 2 tab PO Q6H PRN #90 tab 06/20/18 [Rx]








Follow up Appointment(s)/Referral(s): 


Destin Edwards DO [Doctor of Osteopathic Medicine] - 2 Weeks


Activity/Diet/Wound Care/Special Instructions: 


Weightbearing as tolerated with walker


Leave dressing intact.  Dressing may be removed by home care nurse in 10 days.


May shower with dressing on.


Follow-up with [facility] in 2 weeks, please call with any questions 

or concerns [phone]


Discharge Disposition: HOME WITH HOME HEALTH SERVICES

## 2018-06-20 NOTE — P.PN
Subjective


No overnight events patient is clinically doing well and is being discharged 

today.








Objective





- Vital Signs


Vital signs: 


 Vital Signs











Temp  97.4 F L  06/20/18 06:50


 


Pulse  73   06/20/18 06:50


 


Resp  16   06/20/18 01:00


 


BP  145/81   06/20/18 06:50


 


Pulse Ox  93 L  06/20/18 06:50








 Intake & Output











 06/19/18 06/20/18 06/20/18





 18:59 06:59 18:59


 


Intake Total 1301 1780 240


 


Output Total 400  


 


Balance 901 1780 240


 


Weight 81.647 kg  


 


Intake:   


 


  IV 1201  


 


  Intake, IV Titration  780 





  Amount   


 


    Sodium Chloride 0.9% 1,  780 





    000 ml @ 65 mls/hr IV .   





    [id number] [id number] Rx#:[id number]   


 


  Oral 100 1000 240


 


Output:   


 


  Estimated Blood Loss 400  


 


Other:   


 


  # Voids   2














- Exam


PHYSICAL EXAMINATION: 





GENERAL: The patient is alert and oriented x3, not in any acute distress. Well 

developed, well nourished. 


HEENT: Pupils are round and equally reacting to light. EOMI. No scleral 

icterus. No conjunctival pallor. Normocephalic, atraumatic. No pharyngeal 

erythema. No thyromegaly. 


CARDIOVASCULAR: S1 and S2 present. No murmurs, rubs, or gallops. 


PULMONARY: Chest is clear to auscultation, no wheezing or crackles. 


ABDOMEN: Soft, nontender, nondistended, normoactive bowel sounds. No palpable 

organomegaly. 


MUSCULOSKELETAL: Deferred to orthotics surgery


EXTREMITIES: No cyanosis, clubbing, or pedal edema. 


NEUROLOGICAL: Gross neurological examination did not reveal any focal deficits. 


SKIN: No rashes. 











- Labs


CBC & Chem 7: 


 06/20/18 06:47





Labs: 


 Abnormal Lab Results - Last 24 Hours (Table)











  06/20/18 Range/Units





  06:47 


 


WBC  15.5 H  (3.8-10.6)  k/uL


 


Neutrophils #  12.7 H  (1.3-7.7)  k/uL


 


Monocytes #  1.3 H  (0-1.0)  k/uL














Assessment and Plan


Plan: 


-Coronary artery disease: Patient will be resumed on beta blockers and 

antiplatelet therapy.


-Hypertension patient blood pressure is bit elevated because of her pain we'll 

treat the pain.  Resume her antidepressant medications


-Gastroesophageal reflux disease


-Hyperlipidemia


-Rheumatoid arthritis


-Left hip arthroplasty pain management and DVT prophylaxis as per primary 

service.


No further recommendations from medicine perspective patient can be discharged 

from medicine perspective